# Patient Record
Sex: MALE | Race: WHITE | NOT HISPANIC OR LATINO | Employment: FULL TIME | URBAN - METROPOLITAN AREA
[De-identification: names, ages, dates, MRNs, and addresses within clinical notes are randomized per-mention and may not be internally consistent; named-entity substitution may affect disease eponyms.]

---

## 2020-05-13 ENCOUNTER — TELEPHONE (OUTPATIENT)
Dept: NEUROSURGERY | Facility: CLINIC | Age: 50
End: 2020-05-13

## 2020-05-13 NOTE — TELEPHONE ENCOUNTER
Pt has not been seen in our office, it looks like a referral was placed to Neurosurgery ([facility]) by pt's PCP on 4/29/2020. Not sure anything needs to be done from our office.

## 2020-05-13 NOTE — TELEPHONE ENCOUNTER
Provider:  n/a  Caller: Paige  Time of call:   04:42P  Phone #:  [phone]  Surgery:  n/a  Surgery Date:  n/a  Last visit: n/a    Next visit: n/a    ASAEL:         Reason for call:       Paige from a spine care facility.   She had received a phone call/message regarding records that had been requested from their facility before seeing Dr. Reddy.    She would like to know what records are needed and a fax number to send them to.

## 2020-05-21 ENCOUNTER — TELEPHONE (OUTPATIENT)
Dept: NEUROSURGERY | Facility: CLINIC | Age: 50
End: 2020-05-21

## 2020-05-21 NOTE — TELEPHONE ENCOUNTER
PATIENT CALLED REGARDING THE FOLLOWING APPOINTMENT.  Type: NEW PATIENT  Date: 06/02/20  Provider: DR. VEGAS  Caller: PATIENT  Phone Number: [phone]  Reason: PATIENT CURRENTLY SCHEDULED BUT INQUIRING INTO VIRTUAL VISIT IF POSSIBLE DUE TO LIVING 2 HRS AWAY.  Availability for callback: ANYTIME  Preferred dates for scheduling: AS SOON AS POSSIBLE    PLEASE CONTACT PATIENT FOR FURTHER INSTRUCTION.

## 2020-05-21 NOTE — TELEPHONE ENCOUNTER
HE IS A NEW PATIENT SO HE WILL HAVE TO COME INTO THE OFFICE WITH HIS FILMS. DR. VEGAS PREFERS TO MEET NEW PATIENTS FACE-TO-FACE. PLEASE ADVISE.

## 2020-05-28 RX ORDER — CYCLOBENZAPRINE HCL 10 MG
10 TABLET ORAL 3 TIMES DAILY PRN
COMMUNITY

## 2020-05-28 RX ORDER — METHOCARBAMOL 750 MG/1
750 TABLET, FILM COATED ORAL 4 TIMES DAILY PRN
COMMUNITY

## 2020-05-28 RX ORDER — NAPROXEN 500 MG/1
500 TABLET ORAL 2 TIMES DAILY WITH MEALS
COMMUNITY

## 2020-05-28 RX ORDER — CETIRIZINE HYDROCHLORIDE 10 MG/1
10 TABLET ORAL DAILY
COMMUNITY

## 2020-05-28 RX ORDER — BROMPHENIRAMINE MALEATE, PSEUDOEPHEDRINE HYDROCHLORIDE, AND DEXTROMETHORPHAN HYDROBROMIDE 2; 30; 10 MG/5ML; MG/5ML; MG/5ML
SYRUP ORAL 4 TIMES DAILY PRN
COMMUNITY

## 2020-06-11 ENCOUNTER — TELEPHONE (OUTPATIENT)
Dept: NEUROSURGERY | Facility: CLINIC | Age: 50
End: 2020-06-11

## 2020-06-11 ENCOUNTER — TELEMEDICINE (OUTPATIENT)
Dept: NEUROSURGERY | Facility: CLINIC | Age: 50
End: 2020-06-11

## 2020-06-11 DIAGNOSIS — M54.31 SCIATICA OF RIGHT SIDE: Primary | ICD-10-CM

## 2020-06-11 PROCEDURE — 99203 OFFICE O/P NEW LOW 30 MIN: CPT | Performed by: NEUROLOGICAL SURGERY

## 2020-06-11 RX ORDER — PREDNISONE 20 MG/1
20 TABLET ORAL DAILY
COMMUNITY
Start: 2020-04-09

## 2020-06-11 RX ORDER — DOXYCYCLINE HYCLATE 100 MG
TABLET ORAL
COMMUNITY
Start: 2020-03-31

## 2020-06-11 NOTE — TELEPHONE ENCOUNTER
Please send referral to Dr. Campbell for pain management.     Please have pt get an XR and injection, call me afterwards with an update.

## 2020-06-11 NOTE — PROGRESS NOTES
You have chosen to receive care through a telehealth visit.  Do you consent to use a video/audio connection for your medical care today? YES      NAME: JUAN MIGUEL LEIVA   DOS: 2020  : 1970  PCP: Fabiano Box MD    Chief Complaint:    Chief Complaint   Patient presents with   • Back Pain       History of Present Illness:  49 y.o. male   I saw this very pleasant 49-year-old on a video conference.  He presents with a history of some chronic axial back issues that have been present for a number of years he has been through chiropractic manipulation and has had insidious onset of 2 to 3 years of right-sided hip pain.  He is a     He quit smoking a year ago he reports improvement with chiropractor manipulation had and has seen a surgeon down there in [place] that reported that he did not need surgery he is here for evaluation  Denies any cauda equina syndrome or other symptoms  PMHX  Allergies:  Allergies   Allergen Reactions   • Bactrim [Sulfamethoxazole-Trimethoprim] Other (See Comments)     other     Medications    Current Outpatient Medications:   •  albuterol sulfate  (90 Base) MCG/ACT inhaler, Inhale 2 puffs Every 4 (Four) Hours As Needed., Disp: , Rfl:   •  brompheniramine-pseudoephedrine-DM (Bromfed DM) 30-2-10 MG/5ML syrup, Take  by mouth 4 (Four) Times a Day As Needed for Allergies., Disp: , Rfl:   •  cetirizine (zyrTEC) 10 MG tablet, Take 10 mg by mouth Daily., Disp: , Rfl:   •  cyclobenzaprine (FLEXERIL) 10 MG tablet, Take 10 mg by mouth 3 (Three) Times a Day As Needed for Muscle Spasms., Disp: , Rfl:   •  diclofenac (VOLTAREN) 75 MG EC tablet, Take 75 mg by mouth 2 (Two) Times a Day., Disp: , Rfl:   •  doxycycline (VIBRAMYICN) 100 MG tablet, TAKE 1 TABLET EVERY 12 HOURS DAILY FOR 10 DAYS, Disp: , Rfl:   •  lisinopril-hydrochlorothiazide (PRINZIDE,ZESTORETIC) 10-12.5 MG per tablet, Take 1 tablet by mouth Daily., Disp: , Rfl:   •  methocarbamol (ROBAXIN) 750 MG  tablet, Take 750 mg by mouth 4 (Four) Times a Day As Needed for Muscle Spasms., Disp: , Rfl:   •  naproxen (NAPROSYN) 500 MG tablet, Take 500 mg by mouth 2 (Two) Times a Day With Meals., Disp: , Rfl:   •  predniSONE (DELTASONE) 20 MG tablet, Take 20 mg by mouth Daily., Disp: , Rfl:   •  sildenafil (VIAGRA) 100 MG tablet, Take 100 mg by mouth Daily As Needed., Disp: , Rfl:   Past Medical History:  Past Medical History:   Diagnosis Date   • Acute bronchitis    • Acute pain of right thigh    • 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  Social Hx:  Social History     Tobacco Use   • Smoking status: Current Every Day Smoker   • Smokeless tobacco: Never Used   Substance Use Topics   • Alcohol use: Yes   • Drug use: Never     Family Hx:  History reviewed. No pertinent family history.  Review of Systems:        Review of Systems   Constitutional: Negative for activity change, appetite change, chills, diaphoresis, fatigue, fever and unexpected weight change.   HENT: Negative for congestion, dental problem, drooling, ear discharge, ear pain, facial swelling, hearing loss, mouth sores, nosebleeds, postnasal drip, rhinorrhea, sinus pressure, sneezing, sore throat, tinnitus, trouble swallowing and voice change.    Eyes: Negative for photophobia, pain, discharge, redness, itching and visual disturbance.   Respiratory: Negative for apnea, cough, choking, chest tightness, shortness of breath, wheezing and stridor.    Cardiovascular: Negative for chest pain, palpitations and leg swelling.   Gastrointestinal: Negative for abdominal distention, abdominal pain, anal bleeding, blood in stool, constipation, diarrhea, nausea, rectal pain and vomiting.   Endocrine: Negative for cold intolerance, heat intolerance, polydipsia, polyphagia and polyuria.   Genitourinary: Negative for decreased urine volume, difficulty urinating, dysuria,  enuresis, flank pain, frequency, genital sores, hematuria and urgency.   Musculoskeletal: Positive for back pain. Negative for arthralgias, gait problem, joint swelling, myalgias, neck pain and neck stiffness.   Skin: Negative for color change, pallor, rash and wound.   Allergic/Immunologic: Negative for environmental allergies, food allergies and immunocompromised state.   Neurological: Negative for dizziness, tremors, seizures, syncope, facial asymmetry, speech difficulty, weakness, light-headedness, numbness and headaches.   Hematological: Negative for adenopathy. Does not bruise/bleed easily.   Psychiatric/Behavioral: Negative for agitation, behavioral problems, confusion, decreased concentration, dysphoric mood, hallucinations, self-injury, sleep disturbance and suicidal ideas. The patient is not nervous/anxious and is not hyperactive.    All other systems reviewed and are negative.     I have reviewed this note template and all pertinent parts of the review of systems social, family history, surgical history and medication list      Physical Examination:  There were no vitals filed for this visit.   General Appearance:   Well developed, well nourished, well groomed, alert, and cooperative.  Neurological examination:  Neurologic Exam  Video chat unavailable exam he does report to his gluteal cleft and right-sided hip with pain    Review of Imaging/DATA:  MRI reviewed demonstrates probable L3-4 spondylolisthesis moderate lateral recess stenosis and L5-S1 annular tear  Diagnoses/Plan:    Mr. Briggs is a 49 y.o. male   1.  He presents with a pretty convincing history of chronic right-sided sciatic leg pain is probably from a lateral recess but I cannot exclude the chance of piriformis syndrome SI insufficiency or trochanteric bursitis    I recommended    1.  Lumbar flexion-extension film    2.  X-ray    3.  Transforaminal block L3-4 right    4.  If the above does not control his pain I would recommend an MRI of  the right hip if this is negative    5.  Lumbar myelogram EMG and in person visit    I told him that surgery to improve quality life sometimes can be challenging we talked about yoga heat massage therapy continuation of the diclofenac he would be a candidate for some Lyrica which would be a reasonable option as well as use of an inversion table which I recommended    Pleasure to see him back anytime

## 2020-06-15 ENCOUNTER — TELEPHONE (OUTPATIENT)
Dept: NEUROSURGERY | Facility: CLINIC | Age: 50
End: 2020-06-15

## 2020-06-15 DIAGNOSIS — M54.31 SCIATICA OF RIGHT SIDE: Primary | ICD-10-CM

## 2020-06-15 NOTE — TELEPHONE ENCOUNTER
Can you please enter a new pain management referral. [facility]. Referral already faxed to that office.

## 2020-07-07 ENCOUNTER — TELEPHONE (OUTPATIENT)
Dept: NEUROSURGERY | Facility: CLINIC | Age: 50
End: 2020-07-07

## 2020-07-07 NOTE — TELEPHONE ENCOUNTER
Provider: Barry   Caller: Elia  Time of call: 1065    Phone #: [phone]   Surgery:    Surgery Date:    Last visit: 6/11/2020     Next visit: mendel VYAS:         Reason for call:         Patient does not have his PM appointment until 7/22/2020. He is still having lbp/hip pain. He is wanting to know if we can go ahead and order the Hip MRI. He states that the pain is getting worse as he gets up and down now.

## 2020-07-07 NOTE — TELEPHONE ENCOUNTER
Per Dr. Reddy's documentation he wanted to see how patient did after an injection prior to ordering a hip MRI.

## 2020-07-28 ENCOUNTER — TELEPHONE (OUTPATIENT)
Dept: NEUROSURGERY | Facility: CLINIC | Age: 50
End: 2020-07-28

## 2022-04-05 ENCOUNTER — TELEPHONE (OUTPATIENT)
Dept: NEUROSURGERY | Facility: CLINIC | Age: 52
End: 2022-04-05

## 2022-04-05 NOTE — TELEPHONE ENCOUNTER
Caller: Elia Briggs    Relationship: Self    Best call back number: [phone]    What is the best time to reach you: ANY    Who are you requesting to speak with (clinical staff, provider,  specific staff member): ASHKAN    Do you know the name of the person who called: N/A    What was the call regarding: PATIENT WAS LAST IN CONTACT 7/28/20. HE WOULD LIKE TO SPEAK TO ASHKAN ABOUT POSSIBLY SCHEDULING A TELEVISIT AND DISCUSSING IMAGING DONE TODAY. PATIENT STATES DUE TO INCREASED PAIN HE WAS SEEN IN ED @ [facility] WITH HERNIATED DISC. A CT WAS PERFORMED, AND HE HAS OBTAINED IMAGING DISC. IF AN MRI IS REQUIRED, HE WOULD LIKE TO HAVE IT DONE CLOSER TO HOME ([facility]).    Do you require a callback: PLEASE CALL PATIENT TO ADVISE ON SCHEDULING AND PROVIDING IMAGING DISC FOR DR VALENCIA'S REVIEW.

## 2022-04-07 NOTE — TELEPHONE ENCOUNTER
PT CALLED BACK  AND WAS CHECKING ON HIS PREV.MESSAGE THAT WAS SENT OVER-PT STATES HE HAD NOT HEARD ANYTHING-I ADVISED PLEASE ALLOW 24-48 HOURS FOR A RETURN CALL-PT AWAITING CALL-THANK YOU

## 2022-04-08 NOTE — TELEPHONE ENCOUNTER
Tried to call patient there was no answer. LVM for a return call.    Pt will need to mail MRI disc to our office for Dr. Reddy to review, prior to any scheduling.